# Patient Record
Sex: FEMALE | Race: WHITE | NOT HISPANIC OR LATINO | Employment: UNEMPLOYED | URBAN - METROPOLITAN AREA
[De-identification: names, ages, dates, MRNs, and addresses within clinical notes are randomized per-mention and may not be internally consistent; named-entity substitution may affect disease eponyms.]

---

## 2017-06-13 ENCOUNTER — HOSPITAL ENCOUNTER (EMERGENCY)
Facility: HOSPITAL | Age: 62
Discharge: HOME | End: 2017-06-13
Attending: EMERGENCY MEDICINE | Admitting: EMERGENCY MEDICINE

## 2017-06-13 ENCOUNTER — APPOINTMENT (OUTPATIENT)
Dept: GENERAL RADIOLOGY | Facility: HOSPITAL | Age: 62
End: 2017-06-13

## 2017-06-13 VITALS
HEIGHT: 61 IN | BODY MASS INDEX: 29.45 KG/M2 | HEART RATE: 68 BPM | DIASTOLIC BLOOD PRESSURE: 62 MMHG | TEMPERATURE: 97.8 F | WEIGHT: 156 LBS | SYSTOLIC BLOOD PRESSURE: 134 MMHG | RESPIRATION RATE: 16 BRPM | OXYGEN SATURATION: 97 %

## 2017-06-13 DIAGNOSIS — S93.401A SPRAIN OF RIGHT ANKLE, UNSPECIFIED LIGAMENT, INITIAL ENCOUNTER: ICD-10-CM

## 2017-06-13 DIAGNOSIS — S90.31XA CONTUSION OF RIGHT FOOT, INITIAL ENCOUNTER: Primary | ICD-10-CM

## 2017-06-13 PROCEDURE — 90715 TDAP VACCINE 7 YRS/> IM: CPT | Performed by: NURSE PRACTITIONER

## 2017-06-13 PROCEDURE — 25010000002 TDAP 5-2.5-18.5 LF-MCG/0.5 SUSPENSION: Performed by: NURSE PRACTITIONER

## 2017-06-13 PROCEDURE — 99283 EMERGENCY DEPT VISIT LOW MDM: CPT

## 2017-06-13 PROCEDURE — 90471 IMMUNIZATION ADMIN: CPT | Performed by: NURSE PRACTITIONER

## 2017-06-13 PROCEDURE — 73610 X-RAY EXAM OF ANKLE: CPT

## 2017-06-13 PROCEDURE — 73630 X-RAY EXAM OF FOOT: CPT

## 2017-06-13 RX ORDER — SERTRALINE HYDROCHLORIDE 100 MG/1
100 TABLET, FILM COATED ORAL DAILY
COMMUNITY

## 2017-06-13 RX ORDER — HYDROCODONE BITARTRATE AND ACETAMINOPHEN 7.5; 325 MG/1; MG/1
1 TABLET ORAL ONCE
Status: COMPLETED | OUTPATIENT
Start: 2017-06-13 | End: 2017-06-13

## 2017-06-13 RX ORDER — METOPROLOL SUCCINATE 50 MG/1
50 TABLET, EXTENDED RELEASE ORAL DAILY
COMMUNITY

## 2017-06-13 RX ADMIN — HYDROCODONE BITARTRATE AND ACETAMINOPHEN 1 TABLET: 7.5; 325 TABLET ORAL at 11:40

## 2017-06-13 RX ADMIN — TETANUS TOXOID, REDUCED DIPHTHERIA TOXOID AND ACELLULAR PERTUSSIS VACCINE, ADSORBED 0.5 ML: 5; 2.5; 8; 8; 2.5 SUSPENSION INTRAMUSCULAR at 14:37

## 2017-06-13 NOTE — DISCHARGE INSTRUCTIONS
Ibuprofen as needed   Clean abrasions daily with soap and water, apply antibiotic ointment  Rest, ice and elevate as much as possible  Wear ace wrap and air cast for 5-7 days  Follow up with pmd or clinic of choice as needed  Follow up with Orthopedic md in 5-7 days if symptoms not improving  Return to er for increased pain, swelling, signs of infection, or any new or worsening symptoms    Community Clinics available for follow up:      [facility] [place]             [address] [facility]  [phone]    [facility]  [address] [facility]  [phone]    [place] [address] [facility]  [phone]    [place] [address]  [phone]    [facility]  [address] [facility]  [phone]    [address]  [phone]    [address]  (off [facility])  [phone]    [facility] [address]  [phone]    [facility]  [address] [facility]  [phone]    [facility]ty (STD) [facility]  [phone]    [facility]  [address]  [phone]    [facility] at [place] [id number] [facility]  [phone]    [facility]  [address] [facility]  [phone]

## 2017-06-13 NOTE — ED PROVIDER NOTES
EMERGENCY DEPARTMENT ENCOUNTER    CHIEF COMPLAINT  Chief Complaint: Fall  History given by:patient, family  History limited by:none  Room Number: 02/02  PMD: No Known Provider      HPI:  Pt is a 61 y.o. female who presents with right foot injury onset 1 AM this morning. The pt says she was leaving her hotel in [place] when she slid down three steps injuring her right foot. She says the pain is worse with movement and bearing weight. The pt has no other complaints at this time. Patient denies numbness and tingling, blow to the head, and LOC. She is not UTD with her Tetanus.  Past Medical History of diabetes, depression, cirrhosis.    Duration: onset 1 AM this morning  Timing:constant  Location:right foot  Radiation:denies  Quality:swelling, bruising, aching  Intensity/Severity:mild  Progression:unchanged  Associated Symptoms:right foot pain, bearing weight  Aggravating Factors:movement, ambulation  Alleviating Factors:rest  Previous Episodes:denies  Treatment before arrival:none    PAST MEDICAL HISTORY  Active Ambulatory Problems     Diagnosis Date Noted   • No Active Ambulatory Problems     Resolved Ambulatory Problems     Diagnosis Date Noted   • No Resolved Ambulatory Problems     Past Medical History:   Diagnosis Date   • Arthritis    • Cancer    • Depression    • Diabetes mellitus    • Hypertension        PAST SURGICAL HISTORY  History reviewed. No pertinent surgical history.    FAMILY HISTORY  History reviewed. No pertinent family history.    SOCIAL HISTORY  Social History     Social History   • Marital status:      Spouse name: N/A   • Number of children: N/A   • Years of education: N/A     Occupational History   • Not on file.     Social History Main Topics   • Smoking status: Never Smoker   • Smokeless tobacco: Not on file   • Alcohol use No   • Drug use: No   • Sexual activity: Defer     Other Topics Concern   • Not on file     Social History Narrative   • No narrative on file          ALLERGIES  Review of patient's allergies indicates no known allergies.    REVIEW OF SYSTEMS  Review of Systems   Constitutional: Negative for chills and fever.   HENT: Negative for sore throat.    Gastrointestinal: Negative for nausea and vomiting.   Musculoskeletal: Positive for arthralgias, joint swelling and myalgias. Negative for back pain.        Right Foot   Skin: Negative for rash.   Psychiatric/Behavioral: The patient is not nervous/anxious.        PHYSICAL EXAM  ED Triage Vitals   Temp Heart Rate Resp BP SpO2   06/13/17 1109 06/13/17 1109 06/13/17 1109 06/13/17 1114 06/13/17 1109   98.3 °F (36.8 °C) 68 15 145/74 98 %       Physical Exam   Constitutional: She is oriented to person, place, and time. She appears distressed (pt is in mild distress secondary to pain).   HENT:   Head: Normocephalic.   Mouth/Throat: Mucous membranes are normal.   Eyes: No scleral icterus.   Neck: Normal range of motion.   Cardiovascular: Normal rate, regular rhythm and normal heart sounds.    Pulmonary/Chest: Effort normal and breath sounds normal.   Musculoskeletal: Normal range of motion. She exhibits edema and tenderness. She exhibits no deformity.   RLE - TTP over the lateral malleolus and dorsum of right foot with small abrasion to dorsal aspect.     2+ PT and DP pulses. NV intact distally.    Neurological: She is alert and oriented to person, place, and time.   Skin: Skin is warm and dry.   Psychiatric: Mood and affect normal.   Nursing note and vitals reviewed.      RADIOLOGY  XR Foot 3+ View Right   Final Result   No acute osseous nor articular abnormality.    XR Ankle 3+ View Right   Final Result   No acute osseous nor articular abnormality.        I ordered the above noted radiological studies and reviewed the images on the PACS system.        PROGRESS AND CONSULTS    12:55 PM  Pt rechecked and is resting comfortably in NAD with improved pain and last BP of 145/74. D/w pt radiology results which were negative for  acute fx. Decision to d/c pt was discussed. Pt's right foot will be placed in air cast. Pt was instructed to use cool compresses as needed. Pt understands and agrees with plan of care, all questions and concerns addressed.     1:33 PM  Reviewed pt's history and workup with Dr. Atkinson. At bedside evaluation, they agree with the plan of care.    Reviewed implications of results, diagnosis, meds, responsibility to follow up, warning signs and symptoms of possible worsening, potential complications and reasons to return to ER with patient.  Discussed all results and noted any abnormalities with patient.  Discussed absolute need to recheck abnormalities with ortho and PMD.    Discussed plan for discharge, as there is no emergent indication for admission.  Pt is agreeable and understands need for follow up and repeat testing.  Pt is aware that discharge does not mean that nothing is wrong but it indicates no emergency is present.  Pt is discharged with instructions to follow up with primary care doctor to have their blood pressure rechecked.       DIAGNOSIS  Final diagnoses:   Contusion of right foot, initial encounter   Sprain of right ankle, unspecified ligament, initial encounter       FOLLOW UP   [facility] PHYSICAN REFERRAL SERVICE  [place] [address]  [phone]  Call in 1 week  If symptoms worsen    Alma Mott MD  [address] [facility] [address]  [place] [address]  [phone]    In 1 week  If symptoms worsen          COURSE & MEDICAL DECISION MAKING  Pertinent  Imaging studies that were ordered and reviewed are noted above.  Results were reviewed/discussed with the patient and they were also made aware of online assess.       MEDICATIONS GIVEN IN ER  Medications   HYDROcodone-acetaminophen (NORCO) 7.5-325 MG per tablet 1 tablet (1 tablet Oral Given 6/13/17 1140)       /59 (BP Location: Left arm, Patient Position: Lying)  Pulse 65  Temp 98.3 °F (36.8 °C) (Tympanic)    "Resp 18  Ht 61\" (154.9 cm)  Wt 156 lb (70.8 kg)  SpO2 98%  BMI 29.48 kg/m2      I personally reviewed the past medical history, past surgical history, social history, family history, current medications and allergies as they appear in this chart.  The scribe's note accurately reflects the work and decisions made by me.     I personally scribed for TRACY Reid on 6/13/2017 at 11:55 AM.  Electronically signed by Arik Sevilla on 6/13/2017 at time 11:55 AM            Arik Sevilla  06/13/17 1410       Leyla Reagan, BLADIMIR  06/13/17 1708    "

## 2017-06-13 NOTE — ED TRIAGE NOTES
"Pt states \"When I was leaving the hotel in [place] I slid down three stairs and hurt my Right foot.\"    Pt states she did not hit her head and has no other complaints.  Pt has an abrasion to the top of her right foot and swelling noted.  Patient placed in wheelchair in triage   "

## 2017-06-13 NOTE — ED PROVIDER NOTES
The patient presents complaining of R foot pain s/p fall. Pt also c/o R foot abrasions. Pt states that walking increases her pain.     Patient is nontoxic appearing   Back/extremities: lateral ankle swelling and tenderness    Pt's XRs are negative    Plan to d/c the pt with an ankle brace and tdap.     I supervised care provided by the midlevel provider.  We have discussed this patient's history, physical exam, and treatment plan.  I have reviewed the note and personally saw and examined the patient and agree with the plan of care.  Documentation assistance provided by beto Stanley.  Information recorded by the beto was done at my direction and has been verified and validated by me.       Jamel Stanley  06/13/17 9107       Kaz Atkinson MD  06/13/17 4317

## 2017-06-13 NOTE — ED NOTES
Ice pack placed to pt's right foot/ankle. Small abrasion noted to dorsal portion of pt's right foot. Pedal pulses present and +2 bilaterally. Slight selling noted to right foot/ankle     Trina Villa RN  06/13/17 2742